# Patient Record
Sex: FEMALE | Race: WHITE
[De-identification: names, ages, dates, MRNs, and addresses within clinical notes are randomized per-mention and may not be internally consistent; named-entity substitution may affect disease eponyms.]

---

## 2017-01-05 ENCOUNTER — HOSPITAL ENCOUNTER (OUTPATIENT)
Dept: HOSPITAL 61 - KCIC MRI | Age: 77
Discharge: HOME | End: 2017-01-05
Payer: COMMERCIAL

## 2017-01-05 DIAGNOSIS — R60.1: Primary | ICD-10-CM

## 2017-01-05 PROCEDURE — 73718 MRI LOWER EXTREMITY W/O DYE: CPT

## 2017-01-05 NOTE — [facility]
PROCEDURE 

MR of the right foot 

 

HISTORY 

Injury October 2015. Continued pain at the dorsal foot and along the 5th 

metatarsal. 

 

TECHNIQUE 

Standard multiplanar sequences are obtained. 

 

COMPARISON 

None 

 

FINDINGS 

Subchondral bone marrow edema or contusion identified at the distal 

cuboid. Small subchondral signal alteration at the distal medial cuboid 

suspicious for small nondisplaced subchondral fracture. Less likely, this 

could just be reactive from degenerative change. Minimal subchondral 

marrow edema at the base of the 4th and 5th metatarsals. 

Lisfranc ligament complex is intact as is tarsometatarsal alignment. 

No evidence of tendon rupture or significant tendon sheath fluid. 

No abnormal soft tissue fluid collection. No concerning focal soft tissue 

edema. 

 

IMPRESSION 

Subchondral marrow edema or contusion at the distal cuboid. Suspect small 

nondisplaced fracture at the distal medial aspect of the cuboid. Less 

likely, this could be related to primary osteoarthritis. 

 

Electronically signed by: Destin Lam MD (Jan 05, 2017 12:03:21)

## 2017-03-14 ENCOUNTER — HOSPITAL ENCOUNTER (OUTPATIENT)
Dept: HOSPITAL 61 - KCIC DEXA | Age: 77
Discharge: HOME | End: 2017-03-14
Payer: COMMERCIAL

## 2017-03-14 DIAGNOSIS — M85.88: Primary | ICD-10-CM

## 2017-03-14 PROCEDURE — 77080 DXA BONE DENSITY AXIAL: CPT

## 2017-03-14 NOTE — [facility]
PROCEDURE 

Bone mineral density exam 

 

HISTORY 

Osteopenia, takes calcium supplement, hysterectomy, postmenopausal 

 

COMPARISON 

None 

 

FINDINGS 

Bone mineral density exam utilizing DEXA was performed. Left hip bone 

mineral density of 0.766 grams/centimeter square corresponds with 

T-score-1.4 and a Z-score 0.3. Lumbar spine bone mineral density 1.071 

grams/centimeter square corresponds with T-score of 0.2 and a Z-score 2.6.

World Health Organization criteria for bone mineral density 

interpretation: Normal T-score greater than or equal to-1.0, Osteopenia T 

score between-1.0 and-2.5, Osteoporosis T-score less than or equal to-2.5.

 

 

IMPRESSION 

1. There is osteopenia of the left hip. Bone mineral density of the lumbar

spine is considered within normal limits.

 

 

 

Electronically signed by: Garrett Davalos MD (Mar 14, 2017 16:26:37)

## 2017-03-22 ENCOUNTER — HOSPITAL ENCOUNTER (OUTPATIENT)
Dept: HOSPITAL 63 - DXRADRC | Age: 77
Discharge: HOME | End: 2017-03-22
Attending: PHYSICIAN ASSISTANT
Payer: MEDICARE
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
## 2017-03-22 DIAGNOSIS — M25.561: Primary | ICD-10-CM

## 2017-03-22 DIAGNOSIS — M79.671: ICD-10-CM

## 2017-03-22 DIAGNOSIS — M79.601: ICD-10-CM
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
## 2017-03-22 PROCEDURE — 73562 X-RAY EXAM OF KNEE 3: CPT

## 2017-03-22 PROCEDURE — 73630 X-RAY EXAM OF FOOT: CPT

## 2017-03-22 PROCEDURE — 73090 X-RAY EXAM OF FOREARM: CPT

## 2017-03-22 NOTE — RAD
Right knee, 3 views, 3/22/2017:



History: Pain after fall



There is patchy bony demineralization. No fracture or dislocation is

identified. No significant arthritic change is seen. There is a suggestion of

a small joint effusion.



IMPRESSION: No acute bony abnormality is detected.





Right foot, 3 views, 3/22/2017:



There is a mild hallux valgus deformity with associated degenerative change at

the first MTP joint. There is patchy bony demineralization. No acute fracture

or dislocation is identified. There is mild subcutaneous edema.



IMPRESSION: No acute bony abnormality is detected.





Right forearm, 2 views, 3/22/2017:



The bony structures are demineralized. No fracture is identified.



IMPRESSION: No acute bony abnormality is detected.

## 2017-10-23 ENCOUNTER — HOSPITAL ENCOUNTER (OUTPATIENT)
Dept: HOSPITAL 63 - DXRADRC | Age: 77
Discharge: HOME | End: 2017-10-23
Attending: FAMILY MEDICINE
Payer: COMMERCIAL
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
## 2017-10-23 DIAGNOSIS — S99.912A: Primary | ICD-10-CM
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
## 2017-10-23 PROCEDURE — 73610 X-RAY EXAM OF ANKLE: CPT

## 2017-10-23 NOTE — RAD
INDICATION: LEFT ANKLE INJURY X 1 DAY, FALL FROM LADDER



COMPARISON: None.



IMPRESSION:



 Left ankle:

3 views obtained.

A definite fracture line is not seen.

There is a joint effusion identified within the tibiotalar joint.

## 2017-12-05 ENCOUNTER — HOSPITAL ENCOUNTER (OUTPATIENT)
Dept: HOSPITAL 63 - RAD | Age: 77
Discharge: HOME | End: 2017-12-05
Attending: PHYSICIAN ASSISTANT
Payer: COMMERCIAL

## 2017-12-05 DIAGNOSIS — M25.462: ICD-10-CM

## 2017-12-05 DIAGNOSIS — M79.672: Primary | ICD-10-CM

## 2017-12-05 PROCEDURE — 73610 X-RAY EXAM OF ANKLE: CPT

## 2017-12-05 NOTE — RAD
Indication: Twisted ankle.



Time of exam 1427 hours.



3 views were obtained. Alignment is normal. Ankle mortise is well-maintained.

The talar dome is smooth. No fracture or dislocation is seen. There is

moderate soft tissue swelling.



Impression: Soft tissue swelling. No acute bony abnormality is detected.

## 2021-01-22 ENCOUNTER — HOSPITAL ENCOUNTER (OUTPATIENT)
Dept: HOSPITAL 63 - US | Age: 81
End: 2021-01-22
Attending: PHYSICIAN ASSISTANT
Payer: MEDICARE

## 2021-01-22 DIAGNOSIS — I70.201: Primary | ICD-10-CM

## 2021-01-22 PROCEDURE — 93926 LOWER EXTREMITY STUDY: CPT

## 2021-01-22 NOTE — RAD
Right lower extremity arterial duplex study 1/22/2021



CLINICAL HISTORY: Right leg pain.



TECHNIQUE: Using a combination real-time ultrasound imaging and color-flow and pulse doppler imaging 
techniques, duplex evaluation of the major arterial structures of the right lower extremity was perfo
rmed. Multiple images were obtained.



FINDINGS: Minimal atheromatous/atherosclerotic plaque formation is seen scattered throughout the austin
r arterial structures of the right lower extremity. Biphasic/triphasic arterial waveforms are seen th
roughout.The peak systolic velocities taper normally. No hemodynamically significant stenosis or area
 of occlusion is seen.



IMPRESSION: Minimal atheromatous/atherosclerotic plaque formation is seen scattered throughout the ma
albert arterial structures of the right lower extremity. No hemodynamically significant stenosis or area
 of occlusion is seen.



Electronically signed by: Sunny Joyce MD (1/22/2021 5:57 PM) [id number]

## 2021-03-17 ENCOUNTER — HOSPITAL ENCOUNTER (OUTPATIENT)
Dept: HOSPITAL 63 - PMG | Age: 81
End: 2021-03-17
Attending: PHYSICIAN ASSISTANT
Payer: MEDICARE

## 2021-03-17 DIAGNOSIS — M48.02: ICD-10-CM

## 2021-03-17 DIAGNOSIS — M47.812: Primary | ICD-10-CM

## 2021-03-17 PROCEDURE — 72050 X-RAY EXAM NECK SPINE 4/5VWS: CPT

## 2021-03-17 NOTE — RAD
C-spine 5 views



INDICATION: Left-sided neck pain. History of previous surgery.



TECHNIQUE: AP, bilateral oblique, lateral, and open-mouth odontoid views of the cervical spine were o
btained.



FINDINGS:

Alignment suggests subtle kyphotic angulation at C7-T1 in the setting of an apparently fused disc at 
this level. There is no listhesis and no scoliotic curvature.



The bones are demineralized but no fractures are apparent. No aggressive bony lesions are seen. There
 are ACDF surgical changes at C4-C5 with an interbody graft. Alignment appears anatomic.



Multilevel facet degenerative changes are present throughout the cervical spine. These contribute to 
at least mild bony foraminal stenosis on the left at C3-C4 and moderate bony foraminal stenosis on th
e right at C3-C4. No evidence of hardware loosening or migration.



The soft tissues are unremarkable.



IMPRESSION:

Facet degenerative changes in the cervical spine status post C4-C5 ACDF with findings of at least mil
d to moderate bilateral foraminal stenosis at C3-C4. No fracture or malalignment otherwise appreciate
d on plain film.



Electronically signed by: Jass Smyth MD (3/17/2021 6:21 PM) [id number]

## 2021-05-04 ENCOUNTER — HOSPITAL ENCOUNTER (OUTPATIENT)
Dept: HOSPITAL 61 - KCIC MRI | Age: 81
End: 2021-05-04
Payer: MEDICARE

## 2021-05-04 DIAGNOSIS — M48.02: ICD-10-CM

## 2021-05-04 DIAGNOSIS — M43.22: ICD-10-CM

## 2021-05-04 DIAGNOSIS — M47.22: Primary | ICD-10-CM

## 2021-05-04 PROCEDURE — 72141 MRI NECK SPINE W/O DYE: CPT

## 2021-05-04 NOTE — [facility]
EXAMINATION: Magnetic resonance imaging (MRI) of the cervical spine without contrast 5/4/2021 11:00 A
M



HISTORY: Radiculopathy of the cervical spine. Previous neck surgery years ago. New left-sided pain wi
th left upper extremity pain for 3 weeks.



TECHNIQUE: Multiplanar multi-weighted MRI of the cervical spine was performed without intravenous con
trast using the standard cervical spine protocol.



Contrast information:

None administered



COMPARISON: None available.



FINDINGS:



Anterior cervical discectomy fusion hardware is identified at C4-C5. There is 2 mm anterolisthesis of
 C3 on C4. Mild disc height loss at C3-C4 and C5-C6. Disc desiccation is identified at all levels of 
the cervical spine. Vertebral body heights are maintained. Partial osseous fusion identified at C7-T1
. No acute fracture. Cervical spinal cord signal intensity is normal in all sequences. Craniocervical
 junction is normal. Vertebral artery flow voids are maintained. Skull base is intact. Craniocervical
 junction is intact. Atlantoaxial articulation appears intact. There is no prevertebral edema. No par
aspinal soft tissue abnormality.



C2-C3: There is mild disc bulge. Mild facet arthropathy. No neuroforaminal or spinal canal stenosis.



C3-C4: As a posterior disc osteophyte complex. There is a left central disc extrusion. Mild facet art
hropathy. Mild uncovertebral joint disease. Moderate left and moderate right neuroforaminal stenosis.
 Mild spinal canal stenosis without significant deformity of the cord or cord signal alteration. Find
ings are exacerbated by ligamentum flavum infolding.



C4-C5: This level is fused. No residual neuroforaminal or spinal canal stenosis.



C5-C6: No significant disc herniation. No neuroforaminal or spinal canal stenosis. Mild facet arthrop
athy.



C6-C7: There is mild disc bulge. Mild facet arthropathy. No neuroforaminal or spinal canal stenosis.



C7-T1: This level is partially fused. No neuroforaminal or spinal canal stenosis.



IMPRESSION:



Anterior cervical discectomy and fusion hardware identified at C4-C5 without evidence for hardware fa
ilure. Mild transitional level disease identified at C3-C4.



Electronically signed by: Alesia Rosado MD (5/4/2021 2:02 PM) [id number]

## 2021-05-10 ENCOUNTER — HOSPITAL ENCOUNTER (OUTPATIENT)
Dept: HOSPITAL 61 - PNCL | Age: 81
Discharge: HOME | End: 2021-05-10
Attending: ANESTHESIOLOGY
Payer: MEDICARE

## 2021-05-10 DIAGNOSIS — M47.22: ICD-10-CM

## 2021-05-10 DIAGNOSIS — Z98.42: ICD-10-CM

## 2021-05-10 DIAGNOSIS — Z90.710: ICD-10-CM

## 2021-05-10 DIAGNOSIS — M43.22: ICD-10-CM

## 2021-05-10 DIAGNOSIS — Z88.2: ICD-10-CM

## 2021-05-10 DIAGNOSIS — K21.9: ICD-10-CM

## 2021-05-10 DIAGNOSIS — M48.02: ICD-10-CM

## 2021-05-10 DIAGNOSIS — M54.2: Primary | ICD-10-CM

## 2021-05-10 DIAGNOSIS — M19.90: ICD-10-CM

## 2021-05-10 DIAGNOSIS — Z90.49: ICD-10-CM

## 2021-05-10 DIAGNOSIS — M85.88: ICD-10-CM

## 2021-05-10 DIAGNOSIS — Z88.8: ICD-10-CM

## 2021-05-10 DIAGNOSIS — I10: ICD-10-CM

## 2021-05-10 DIAGNOSIS — Z98.890: ICD-10-CM

## 2021-05-10 DIAGNOSIS — Z79.899: ICD-10-CM

## 2021-05-10 DIAGNOSIS — Z98.41: ICD-10-CM

## 2021-05-10 DIAGNOSIS — Z88.0: ICD-10-CM

## 2021-05-10 PROCEDURE — 62321 NJX INTERLAMINAR CRV/THRC: CPT

## 2021-05-10 NOTE — PDOC4
PROCEDURE


Procedure


Patient was consented for cervical epidural steroid injection.  Risks were d

iscussed including but not limited to: Bleeding, infection, possibility of 

epidural hematoma and subsequent neurological compromise, dural puncture, 

headaches, spinal cord and/or nerve damage, side effects of steroid medication, 

and poor results regarding pain control.  Patient understands and wished to 

proceed.


Procedure cervical epidural steroid injection at the C6-7 level, using local 

anesthetic under sterile prep and drape using C-arm fluoroscopic guidance under 

local anesthesia medications injected  ;120 mg Depo-Medrol +5 mL  normal saline 

and 2 mL contrast; condition at discharge is stable patient tolerated procedure 

well. and had no complications











VICKI THOMAS MD               May 10, 2021 12:55

## 2021-05-10 NOTE — PDOC1
INITIAL PAIN CONSULT


DATE OF SERVICE:


DOS:


DATE: 5/10/21 


TIME: 12:48





CHIEF COMPLAINT:


Chief Complaint:


Neck and left upper extremity pain





HISTORY OF PRESENT ILLNESS:


80-year-old female presents with history of pain with base the neck and 

shoulders on the left side rating to the left upper extremity for about 4 months

not the result of any specific injury or accident that she is aware but getting 

worse with time rating the base the neck and left shoulder posteriorly also 

anteriorly into the posterior tricep and forearm and the hand involving all the 

fingers.  Patient reports that sharp and stabbing in the neck throbbing and 

shooting in the left arm burning quality as well patient reports no overt motor 

loss but some minor fatigability with the left hand and arm associate with fine 

motor movements and lifting patient reports the pain is worse with repetitive 

motions reaching over her head with her left arm has been waking her from sleep 

occasionally but not every night and fine motor movements with the left hand of 

been a little more difficult.  Patient reports that it does not affect her bowel

bladder control, wakes her from sleep rarely but does affect her activity when 

she is up walking standing or reaching especially with weightbearing with the 

left arm.  Patient is had physical therapy for the past 3 weeks as well as 

chiropractic treatment and exercise all of which helps only temporarily with the

pain in the left shoulder and arm.  Patient has had previous surgery both 

anteriorly and posteriorly in the cervical spine as well.  Patient did have an 

MRI scan of the cervical spine dated May 4, 2021 showing anterior cervical 

discectomy and fusion hardware identified C4-5 without evidence of hardware 

failure mild transitional level disease and a 5 C3-4 with a left central disc 

extrusion moderate left and moderate right neuroforaminal stenosis mild spinal 

canal stenosis without significant deformity of the cord.  Patient rates her 

disability rating 0-10 10 being the worst is a 7 with family home 

responsibilities recreation social activity occupation 5 with sexual behavior, 

self-care and life support activities.





PAST MEDICAL HISTORY:


PMH:


Hypertension, arthritis, gastroesophageal reflux, osteopenia





PREVIOUS SURGERIES:


Past Surgical Hx:


Abdominal hysterectomy, bilateral cataract extractions, hiatal hernia repair, 

cholecystectomy, anterior cervical fusion, posterior laminectomy of cervical 

spine, splenectomy, sinus surgery x2, bladder repair, toe surgery





CURRENT MEDICATIONS:


Current Meds:


See patient's chart





ALLERGIES;


Allergies:  


Coded Allergies:  


     Penicillins (Verified  Allergy, Intermediate, 5/10/21)


     metoclopramide (Verified  Allergy, Intermediate, 5/10/21)


     moxifloxacin (Verified  Allergy, Intermediate, 5/10/21)


     Sulfa (Sulfonamide Antibiotics) (Verified  Adverse Reaction, Intermediate, 

Nausea, 5/10/21)





FAMILY HISTORY:


Family Hx:


Heart disease patient's father and cancer in patient's mother





SOCIAL HISTORY:


Social Hx:


Patient does not verenice alcohol does not smoke says any illegal illicit 

recreational drugs is  lives with her spouse lives locally in [facility] and is a 





REVIEW OF SYSTEMS:


ROS:


Positive for those items mentioned in history of present illness, all systems 

are reviewed, otherwise negative ,and are complete full and well-documented on 

patient's chart.





PHYSICAL EXAM:


VS:


Blood pressure is 150/91 pulse 111.  Temperature is 98.1 F height is 5 feet 5 

inches weight is 150 pounds


PE:


PHYSICAL EXAMINATION:





GENERAL: The patient is awake, alert, oriented, appropriate, very pleasant 

demeanor


HEENT: Shows normocephalic, atraumatic.  Extraocular movements are intact and 

symmetrical.  Oral cavity: Mucous membranes moist and pink.  Dentition is 

intact.


NECK: Shows anterior throat supple without palpable lymphadenopathy noted.  

Swallow reflex symmetrical.


CHEST: Shows normal on inspection.  Breath sounds are clear bilaterally, no 

rales rhonchi or wheezes auscultated.


HEART: Shows S1, S2 clear.  No murmurs auscultated.


ABDOMEN: Soft, nontender, nondistended, obese.  No palpable organomegaly is 

noted.  No rebound or guarding demonstrated.


BACK: Shows spine grossly in the midline.  Normal-appearing cervical lordotic 

curvature.  Cervical paraspinous muscles show symmetrical on inspection, 

palpation shows moderate tenderness diffusely bilaterally diffusely without 

significant radiation was more tender on the left than the right.  Trapezius but

without trigger points or radiation.  Patient has good rotation motion cervical 

spine both laterally as well as full extension full forward flexion without 

significant limitation or increase in pain.  There is slightly increased 

thoracic kyphosis, some minor flattening of the lumbar lordotic curvature.


EXTREMITIES: Upper extremities show deep tendon reflexes 2+ in the biceps and 

triceps tendons.  Motor exam is 5 on a scale of 5 with right  strength, 

biceps and triceps flexion and 5/5 on the left.  Peripheral pulses are 2+ 

radial.  No peripheral edema is noted bilaterally.  Upper extremities are warm 

and dry to touch, equal in color and appearance.  


SKIN: Shows warm and dry, good turgor.  No edema.  No sores, rashes or bruising 

throughout.





IMPRESSION:


Impression:


80-year-old female with 4-month history pain base the neck left upper extremity 

radicular fashion.


MRI scan cervical spine as noted


Hypertension


Arthritis


Gastroesophageal reflux


Anxiety and depression





Plan: Options were discussed with the patient including conservative medical 

management as therapies interventional techniques that she elected to int

erventional techniques.  We discussed a cervical epidural steroid injection 

using description as well as anatomical model to describe the procedure.  Risks 

were discussed including but not limited to: Bleeding, infection, possibility of

epidural hematoma and subsequent neurological compromise, dural puncture, 

headaches, spinal cord and/or nerve damage, side effects of steroid medication, 

and poor results regarding pain control.  Patient understands and wished to 

proceed.  Patient will return to the clinic in approximate 2 weeks for follow-up

was counseled as to return appointment, activity level, and side effects to be 

aware of.





Procedure cervical epidural steroid injection at the C6-7 level, using local 

anesthetic under sterile prep and drape using C-arm fluoroscopic guidance under 

local anesthesia medications injected  ;120 mg Depo-Medrol +5 mL  normal saline 

and 2 mL contrast; condition at discharge is stable patient tolerated procedure 

well. and had no complications











VICKI THOMAS MD               May 10, 2021 12:54

## 2021-06-01 ENCOUNTER — HOSPITAL ENCOUNTER (OUTPATIENT)
Dept: HOSPITAL 61 - PNCL | Age: 81
Discharge: HOME | End: 2021-06-01
Attending: ANESTHESIOLOGY
Payer: MEDICARE

## 2021-06-01 DIAGNOSIS — Z88.8: ICD-10-CM

## 2021-06-01 DIAGNOSIS — Z88.0: ICD-10-CM

## 2021-06-01 DIAGNOSIS — Z88.2: ICD-10-CM

## 2021-06-01 DIAGNOSIS — M96.1: ICD-10-CM

## 2021-06-01 DIAGNOSIS — Z79.82: ICD-10-CM

## 2021-06-01 DIAGNOSIS — M50.10: Primary | ICD-10-CM

## 2021-06-01 DIAGNOSIS — Z79.899: ICD-10-CM

## 2021-06-01 PROCEDURE — 62321 NJX INTERLAMINAR CRV/THRC: CPT

## 2021-06-01 NOTE — PDOC4
PROCEDURE


Procedure


Patient was consented for cervical epidural steroid injection.  Risks were d

iscussed including but not limited to: Bleeding, infection, possibility of 

epidural hematoma and subsequent neurological compromise, dural puncture, 

headaches, spinal cord and/or nerve damage, side effects of steroid medication, 

and poor results regarding pain control.  Patient understands and wished to 

proceed.


Procedure cervical epidural steroid injection at the C6-7 level, using local 

anesthetic under sterile prep and drape using C-arm fluoroscopic guidance under 

local anesthesia medications injected  ;120 mg Depo-Medrol +5 mL  normal saline 

and 2 mL contrast; condition at discharge is stable patient tolerated procedure 

well. and had no complications











VICKI THOMAS MD                Jun 1, 2021 11:48

## 2021-06-01 NOTE — PDOC
Progress Note - Pain Clinic


Date of Service:


DOS:


DATE: 6/1/21 


TIME: 11:43





Diagnosis:


Dx:


Cervical radiculopathy with cervical degenerative disc disease and cervical 

postlaminectomy syndrome





History or Present Illness:


HPI:


80-year-old female returns for follow-up status post cervical epidural steroid 

injection x1.  Patient reports about 20% improvement overall in the base the 

neck and left shoulder still rating pain left posterior deltoid posterior 

triceps patient rates her pain as 8 on scale 10 is worse over the past week 6 on

average 3 at its least and is a 6 today patient reports is cramping and aching 

tight in the left upper extremity again doing better with activities at home 

travel with greater ease and comfort and sleeping much better patient reports no

longer's awakens her from sleep at night.  Patient reports no new motor or 

sensory deficits or other complaints.





Physical Exam:


VS:


Blood pressure is 160/85 pulse 78 respirations 18 temperature 98.2 F height 5 

feet 5 inches weight 148 pounds


PE:


PHYSICAL EXAMINATION:





GENERAL: The patient is awake, alert, oriented, appropriate, very pleasant 

demeanor


HEENT: Shows normocephalic, atraumatic.  Extraocular movements are intact and 

symmetrical.  Oral cavity: Mucous membranes moist and pink.  Dentition is 

intact.


NECK: Shows anterior throat supple without palpable lymphadenopathy noted.  

Swallow reflex symmetrical.


CHEST: Shows normal on inspection.  Breath sounds are clear bilaterally no rales

or rhonchi.


HEART: Shows S1, S2 clear.  No murmurs auscultated.


ABDOMEN: Soft, nontender, nondistended, obese.  No palpable organomegaly is 

noted.  


BACK: Shows spine grossly in the midline.  Normal-appearing cervical lordotic 

curvature.  Cervical paraspinous muscles show symmetrical inspection on 

palpation some moderate tenderness diffusely but only diffusely in the inferior 

aspect of the cervical paraspinous muscles are more on the left than the right 

as well as into the superior medial trapezius on the left without trigger points

without asymmetry.  Patient shows good rotation of motion cervical spine both 

laterally as well as extension flexion without significant increase in pain or 

limitation.  There is slightly increased thoracic kyphosis, some minor 

flattening of the lumbar lordotic curvature. 


EXTREMITIES: Upper extremities show deep tendon reflexes 2+ in the biceps and 

triceps tendons.  Motor exam is 5 on a scale of 5 with right , biceps and 

triceps flexion and 5/5 on the left.  Peripheral pulses are 2+ radial.  No pierre

pheral edema is noted bilaterally.  Upper extremities are warm and dry to touch,

equal in color and appearance. 


SKIN: Shows warm and dry, good turgor.  No edema.  No sores, rashes or bruising 

throughout.





Procedure:


Procedure:


Options were discussed with the patient.  Patient's old chart was reviewed as 

her current medication regimen updated current review of systems updated today 

as well.  We will proceed with a second in the series cervical epidural steroid 

injection today with fluoroscopic guidance.  Risks were discussed including but 

not limited to: Bleeding, infection, possibility of epidural hematoma and 

subsequent neurological compromise, dural puncture, headaches, spinal cord 

and/or nerve damage, side effects of steroid medication, and poor results 

regarding pain control.  Patient understands and wished to proceed.  Patient 

will return to the clinic in approximate 2 weeks for follow-up, was counseled as

return appointment activity level and side effects to be aware of.





Medication Injected:


Med Injected:


Procedure cervical epidural steroid injection at the C6-7 level, using local 

anesthetic under sterile prep and drape using C-arm fluoroscopic guidance under 

local anesthesia medications injected  ;120 mg Depo-Medrol +5 mL  normal saline 

and 2 mL contrast; condition at discharge is stable patient tolerated procedure 

well. and had no complications





Condition at Discharge:


Condition at Discharge:


Condition at discharge stable, patient already procedure well and had no 

complications.











VICKI HTOMAS MD                Jun 1, 2021 11:47

## 2021-06-26 ENCOUNTER — HOSPITAL ENCOUNTER (EMERGENCY)
Dept: HOSPITAL 63 - ER | Age: 81
Discharge: HOME | End: 2021-06-26
Payer: MEDICARE

## 2021-06-26 VITALS — BODY MASS INDEX: 24.87 KG/M2 | WEIGHT: 149.25 LBS | HEIGHT: 65 IN

## 2021-06-26 VITALS
SYSTOLIC BLOOD PRESSURE: 161 MMHG | DIASTOLIC BLOOD PRESSURE: 88 MMHG | DIASTOLIC BLOOD PRESSURE: 88 MMHG | SYSTOLIC BLOOD PRESSURE: 161 MMHG

## 2021-06-26 DIAGNOSIS — B02.9: Primary | ICD-10-CM

## 2021-06-26 DIAGNOSIS — H01.003: ICD-10-CM

## 2021-06-26 DIAGNOSIS — H01.006: ICD-10-CM

## 2021-06-26 PROCEDURE — 99283 EMERGENCY DEPT VISIT LOW MDM: CPT

## 2021-06-26 NOTE — PHYS DOC
Adult General


HPI


HPI





Patient is a 80-year-old female presenting for eye issues.  Reports having 

history of 5-FU cream use, states she was supposed to take this for 2 weeks only

but ended up taking it for x4 weeks.  She stopped using this a few days ago and 

since has been putting Vaseline on her nose.  In addition, patient with known 

history of shingles reports mild symptoms that started approximately 5 days ago.

 She saw a local urgent care 3 days ago as she thought she had shingles outbreak

starting on her left superior forehead region and acyclovir was started with 

instructions to return if she thought there was any development/progression into

her eye.  She presents today as she woke up with some mild changes of her left 

eye, no difficulty seeing or blurred vision but admits visual abnormalities 

around the orbit.  Nothing known makes better or worse, she has not tried to 

provide any type of care to alleviate her symptoms.





Review of Systems


Review of Systems


Fourteen body systems of review of systems have been reviewed. See HPI for 

pertinent positives and negative responses, other wise all other systems are 

negative, non-pertinent or non-contributory





Physical Exam


Physical Exam


Constitutional: Well developed, well nourished, no acute distress, non-toxic 

appearance. 


HENT: Normocephalic, atraumatic, bilateral external ears normal, oropharynx 

moist, no oral exudates, external nose skin inflamed and chapped consistent in 

appearance with 5-FU cream use


Eye exam:


The patient was examined with the slit lamp.


Extraocular movements are intact


Pupils are equally round and reactive to light


Eyelids/under eyelids: No obvious foreign bodies but bilateral lower eyelids 

inflamed with mild subcutaneous edema present


Conjunctivae and sclera: normal


Corneas: normal without fluorescein uptake, and negative Juan sign


Anterior chambers: normal without cell, flare, or hyphema


Eye pressures (tonometer): left eye 14 , right eye 15


No obvious findings of dendritic/herpetic lesions or involvement of eye


Neck: Normal range of motion, no tenderness, supple, no stridor.  


Cardiovascular: Heart rate regular, sinus rhythm, no murmurs rubs or gallops


Lungs & Thorax:  Bilateral breath sounds clear to auscultation 


Abdomen: Bowel sounds normal, soft, no tenderness, no masses, no pulsatile 

masses.  Nonsurgical abdomen, no peritoneal signs


Skin: Warm, dry, no erythema, nose findings noted above.  There is a 2 cm x 2 cm

area of healing shingles present to left anterior forehead on patient's hairline


Back: No tenderness, no CVA tenderness.  


Extremities: No tenderness, no cyanosis, no clubbing, ROM intact, no edema.  


Neurologic: Alert and oriented X 3, grossly normal motor & sensory function, no 

focal deficits noted. 


Psychologic: Affect normal, judgement normal, mood normal.





Current Patient Data


Vital Signs





Vital Signs








  Date Time  Temp Pulse Resp B/P (MAP) Pulse Ox O2 Delivery O2 Flow Rate FiO2


 


6/26/21 13:26 98.0 82 16 161/88 (112) 97   








Vital Signs








  Date Time  Temp Pulse Resp B/P (MAP) Pulse Ox O2 Delivery O2 Flow Rate FiO2


 


6/26/21 13:26 98.0 82 16 161/88 (112) 97   











EKG


EKG


[]





Radiology/Procedures


Radiology/Procedures


[]





Heart Score


C/O Chest Pain:  No


Risk Factors:


Risk Factors:  DM, Current or recent (<one month) smoker, HTN, HLP, family 

history of CAD, obesity.


Risk Scores:


Risk Factors:  DM, Current or recent (<one month) smoker, HTN, HLP, family 

history of CAD, obesity.





Course & Med Decision Making


Course & Med Decision Making


ABCs unremarkable. I disclosed entirety of ER findings and discussed most likely

 diagnosis of recovering shingles for which she should continue acyclovir 

treatment for in addition to complications from recent 5-FU cream and 

blepharitis of bilateral eyes for which continued supportive care practices were

 advised. I stressed need for close outpatient primary care and Ortho follow-up 

to review today's ER visit. Strict return precautions were also discussed at 

length with good understanding by patient. Patient voiced understanding and 

agreement with the plan. Patient knows to come back for repeat evaluation if 

concerning signs or symptoms present prior to outpatient follow-up. 

Hemodynamically stable, ambulatory and well-appearing at time of disposition.





Dragon Disclaimer


Dragon Disclaimer


This electronic medical record was generated, in whole or in part, using a voice

 recognition dictation system.





Departure


Departure:


Impression:  


   Primary Impression:  


   On 5-fluorouracil (5-FU) therapy


   Additional Impressions:  


   Shingles


   Blepharitis of both eyes


Disposition:  01 HOME / SELF CARE / HOMELESS


Referrals:  


DESIREE REAL (PCP)





Additional Instructions:  


As discussed prior to ER departure, you are suffering from multiple issues.  

Your vital signs and physical exam was nonconcerning for any emergent or 

surgical issues.  It appears your recent topical 5-FU cream irritated your nose 

which is to be expected with said medication but also looks like there might be 

some mild eye involvement.  You have been doing well by stopping treatment and 

applying Vaseline.  As discussed, lid hygiene, avoiding eye make-up, warm 

compresses 4 times a day for 15 minutes, scrubbing both upper and lower lids 

with mild shampoo twice a day are recommended.  Continue your daily antidry 

eyedrops as well.  There is no obvious involvement of shingles in your eyes.  In

 addition, your isolated area of shingles on your forehead is likely an 

additional and separate finding, you should continue antiviral therapy as 

previously prescribed.  You should contact your primary care and ophthalmologist

 first thing Monday morning to review ER visit today and need for close 

outpatient follow-up to ensure continued symptomatic improvement.  If any co

ncerning signs or symptoms present prior to outpatient follow-up please do not 

hesitate to come back for repeat evaluation.  It was a pleasure to take care of 

you and I wish you the best going forward





Problem Qualifiers











RICHI HENLEY DO                 Jun 26, 2021 13:30

## 2022-05-02 ENCOUNTER — HOSPITAL ENCOUNTER (OUTPATIENT)
Dept: HOSPITAL 63 - RAD | Age: 82
End: 2022-05-02
Attending: PHYSICIAN ASSISTANT
Payer: MEDICARE

## 2022-05-02 DIAGNOSIS — M41.86: ICD-10-CM

## 2022-05-02 DIAGNOSIS — M48.061: ICD-10-CM

## 2022-05-02 DIAGNOSIS — M25.78: ICD-10-CM

## 2022-05-02 DIAGNOSIS — M51.36: Primary | ICD-10-CM

## 2022-05-02 PROCEDURE — 72110 X-RAY EXAM L-2 SPINE 4/>VWS: CPT

## 2022-05-02 NOTE — RAD
EXAM:  XR LUMBAR SPINE 4+V 5/2/2022 12:12 PM



CLINICAL INDICATION:  Chronic back pain



COMPARISON:  None



TECHNIQUE:  AP, lateral, right and left oblique, and coned-down lateral view of the lumbar spine



FINDINGS:  There are small ribs at T12. There are 5 nonrib-bearing lumbar vertebral bodies. Mild levo
scoliosis of lumbar spine centered at L3-L4. No acute fracture. Alignment is normal. There is mild di
sc space narrowing with tiny anterior osteophytes at L2-L3 through L4-L5.



IMPRESSION:  Mild lumbar scoliosis and degenerative disc disease from L2-L3 through L4-L5.



Electronically signed by: Staci Rome MD (5/2/2022 2:28 PM) [id number]